# Patient Record
Sex: FEMALE | Race: BLACK OR AFRICAN AMERICAN | NOT HISPANIC OR LATINO
[De-identification: names, ages, dates, MRNs, and addresses within clinical notes are randomized per-mention and may not be internally consistent; named-entity substitution may affect disease eponyms.]

---

## 2019-12-06 PROBLEM — Z00.00 ENCOUNTER FOR PREVENTIVE HEALTH EXAMINATION: Status: ACTIVE | Noted: 2019-12-06

## 2020-01-29 ENCOUNTER — APPOINTMENT (OUTPATIENT)
Dept: INTERNAL MEDICINE | Facility: CLINIC | Age: 50
End: 2020-01-29

## 2020-12-06 ENCOUNTER — EMERGENCY (EMERGENCY)
Facility: HOSPITAL | Age: 50
LOS: 0 days | Discharge: HOME | End: 2020-12-06
Attending: EMERGENCY MEDICINE | Admitting: EMERGENCY MEDICINE
Payer: MEDICAID

## 2020-12-06 VITALS — HEART RATE: 91 BPM | SYSTOLIC BLOOD PRESSURE: 139 MMHG | DIASTOLIC BLOOD PRESSURE: 91 MMHG

## 2020-12-06 VITALS
TEMPERATURE: 99 F | DIASTOLIC BLOOD PRESSURE: 99 MMHG | OXYGEN SATURATION: 98 % | SYSTOLIC BLOOD PRESSURE: 139 MMHG | RESPIRATION RATE: 18 BRPM | HEART RATE: 75 BPM

## 2020-12-06 DIAGNOSIS — F41.9 ANXIETY DISORDER, UNSPECIFIED: ICD-10-CM

## 2020-12-06 DIAGNOSIS — M54.2 CERVICALGIA: ICD-10-CM

## 2020-12-06 DIAGNOSIS — F43.0 ACUTE STRESS REACTION: ICD-10-CM

## 2020-12-06 PROCEDURE — 99283 EMERGENCY DEPT VISIT LOW MDM: CPT

## 2020-12-06 RX ORDER — KETOROLAC TROMETHAMINE 30 MG/ML
15 SYRINGE (ML) INJECTION ONCE
Refills: 0 | Status: DISCONTINUED | OUTPATIENT
Start: 2020-12-06 | End: 2020-12-06

## 2020-12-06 RX ORDER — HYDROXYZINE HCL 10 MG
25 TABLET ORAL ONCE
Refills: 0 | Status: COMPLETED | OUTPATIENT
Start: 2020-12-06 | End: 2020-12-06

## 2020-12-06 RX ORDER — DEXAMETHASONE 0.5 MG/5ML
4 ELIXIR ORAL ONCE
Refills: 0 | Status: COMPLETED | OUTPATIENT
Start: 2020-12-06 | End: 2020-12-06

## 2020-12-06 NOTE — ED BEHAVIORAL HEALTH ASSESSMENT NOTE - SUMMARY
WENDIE ALLAN is a 50y old -American Female, single with 9 children ages 11-30, with no known psychiatric history or IPP admissions, who presented to the ED for neck pain. Psychiatry was consulted for mental health evaluation as patient reportedly had engaged in bizarre, suspicious behavior.    On evaluation, patient does exhibit some anxiety and sub-optimal coping skills for her various psychosocial stressors, this has not impacted her ability to function and take care of herself and her children. While patient is exhibiting some pressured speech and mild grandiosity, she does not appear to currently be having a manic or hypomanic episode, and this appears to be patient's reported chronic baseline. She is not suicidal or psychotic at this time. She is asking for and would likely benefit from establishment of good outpatient psychotherapy.      Recommendations:  -no indication for IPP  -no acute psychiatric intervention required  -please provide patient with information for [facility] clinic at [address], [phone]

## 2020-12-06 NOTE — ED BEHAVIORAL HEALTH ASSESSMENT NOTE - REFERRAL / APPOINTMENT DETAILS
please provide patient with information for [facility] clinic at [address], [phone]

## 2020-12-06 NOTE — ED PROVIDER NOTE - CLINICAL SUMMARY MEDICAL DECISION MAKING FREE TEXT BOX
Patient was signed out to me by Dr. Abraham pending psychiatric recommendations. patient remained stable in ED, patient was evaluated by psychiatry and cleared for discharge. Patient states that she is feeling lot better and wanted to go home. Patient refused any further medical evaluation or care. Patient wanted to follow up with her doctor. Patient is awake, alert, sitting comfortably on the examination table, answering questions appropriately and appears well. Discussed with patient about the need for close outpatient follow up, she verbalized understanding and agreed. Patient is provided with return precautions.
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
## 2020-12-06 NOTE — ED PROVIDER NOTE - PHYSICAL EXAMINATION
CONSTITUTIONAL: Well-developed; well-nourished; in no acute distress.   SKIN: warm, dry  HEAD: Normocephalic; atraumatic.  EYES: PERRL, EOMI, no conjunctival erythema  ENT: No nasal discharge; airway clear.  NECK: Supple; non tender.  CARD: S1, S2 normal; no murmurs, gallops, or rubs. Regular rate and rhythm.   RESP: No wheezes, rales or rhonchi.  ABD: soft ntnd  EXT: Full ROM of neck. No midline or para-spinal TTP. No swelling or bruising.  Impression: Neck pain with no focal findings and anxiety disorder with delusional thoughts, will speak to psych. No SI/HI.  LYMPH: No acute cervical adenopathy.  NEURO: Alert, oriented, grossly unremarkable. CNs 2-12 intact. No focal neuro. deficits.  PSYCH: Pt. paranoid with delusional thoughts, paranoid about medications being offered to her in the ED. CONSTITUTIONAL: Well-developed; well-nourished; in no acute distress.   SKIN: warm, dry  HEAD: Normocephalic; atraumatic.  EYES: PERRL, EOMI, no conjunctival erythema  ENT: No nasal discharge; airway clear.  NECK: Supple; non tender.  CARD: S1, S2 normal; no murmurs, gallops, or rubs. Regular rate and rhythm.   RESP: No wheezes, rales or rhonchi.  ABD: soft ntnd  EXT: Full ROM of neck. No midline or para-spinal TTP. No swelling or bruising.  LYMPH: No acute cervical adenopathy.  NEURO: Alert, oriented, grossly unremarkable. CNs 2-12 intact. No focal neuro. deficits.  PSYCH: Pt. paranoid with delusional thoughts, paranoid about medications being offered to her in the ED.

## 2020-12-06 NOTE — ED BEHAVIORAL HEALTH ASSESSMENT NOTE - RISK ASSESSMENT
Patient's risk of self-harm is mitigated by her lack of suicidal ideation, good social support, good access to care, and willingness to seek help. Low Acute Suicide Risk

## 2020-12-06 NOTE — ED ADULT NURSE NOTE - NSIMPLEMENTINTERV_GEN_ALL_ED
Implemented All Universal Safety Interventions:  [place] to call system. Call bell, personal items and telephone within reach. Instruct patient to call for assistance. Room bathroom lighting operational. Non-slip footwear when patient is off stretcher. Physically safe environment: no spills, clutter or unnecessary equipment. Stretcher in lowest position, wheels locked, appropriate side rails in place.

## 2020-12-06 NOTE — ED ADULT NURSE REASSESSMENT NOTE - NS ED NURSE REASSESS COMMENT FT1
1:1 constant observation initiated by MD Abraham. Pt refusing to get undressed despite education, anxious, screaming at this time. MD at bedside. Charge RN made aware. 1:1 constant observation initiated by MD Abraham. Pt refusing to get changed despite education, anxious, screaming at this time. MD at bedside. Charge RN made aware.

## 2020-12-06 NOTE — ED PROVIDER NOTE - PATIENT PORTAL LINK FT
You can access the FollowMyHealth Patient Portal offered by [facility] by registering at the following website: [email or web address]. By joining [employer]’s FollowMyHealth portal, you will also be able to view your health information using other applications (apps) compatible with our system.

## 2020-12-06 NOTE — ED ADULT TRIAGE NOTE - CHIEF COMPLAINT QUOTE
pt was building a cat house outside and now c.o shooting pains up neck, worse when turning head. also has hx of sinusitis and c/o facial pressure

## 2020-12-06 NOTE — ED PROVIDER NOTE - OBJECTIVE STATEMENT
49 y/o F with PMH of anxiety and depression presenting to the ED for neck pain x2 days. Pt. states that she was putting together a cat bed when she turned her head sharply and suddenly began experiencing sharp pain in her neck  Pain radiates up toward her head. Also endorsing increased fatigue lately, takes care of 9 children, 15 grandchildren, and several cats. Requesting to speak with a therapist about her home stressors. Denies direct trauma or injury to the head or neck, no falls, no LOC, no paresthesias, no SI or HI. 51 y/o F with PMH of anxiety and depression presenting to the ED for neck pain x2 days. Pt. states that she was putting together a cat bed when she turned her head sharply and suddenly began experiencing sharp pain in her neck. Pain radiates up toward her head. Also endorsing increased fatigue lately, takes care of 9 children, 15 grandchildren, and several cats. Requesting to speak with a therapist about her home stressors. Denies direct trauma or injury to the head or neck, no falls, no LOC, no paresthesias, no SI or HI.

## 2020-12-06 NOTE — ED PROVIDER NOTE - PROGRESS NOTE DETAILS
ATTENDING NOTE:   51 y/o F with PMH of anxiety, depression, and chronic sinusitis p/w neck pain x 2 days. Pt was putting together a cat bed when she turned her head sharply and began having sharp pain in her neck radiating to her head. Pt was seen by the ENT PTA, workup was nl. Additionally reports feeling tired; notes she cares for her 9 children, 15 grandchildren, and 13 cats and wishes to speak with a therapist about her stress. Denies f/c, direct trauma, falls, numbness/tingling/weakness, decreased ROM of neck, or SI/HI.   Pt anxious appearing but in NAD. CTAB. Abdomen soft NTND. FROM of neck without tenderness. No midline or para-spinal TTP. S1S2. No LE swelling. No focal neuro deficits. Pt paranoid with delusional thoughts.   Impression: Neck pain with no focal findings and anxiety disorder with delusional thoughts, will speak to psych. No SI/HI. s/o Dr. Morales f/u psuch and re-eval

## 2020-12-06 NOTE — ED ADULT NURSE REASSESSMENT NOTE - NS ED NURSE REASSESS COMMENT FT1
Pt anxious at this time. Pt refusing all the medication despite education. Pt states "I am so anxious and tired right now. I have kids to take care of and i'm overwhelmed. I have a hx of anxiety and depression. I followed up with so many doctors and they couldn't find the reason why I have this symptoms. I want to speak to someone" MD made aware that pt requesting to speak to someone. Pt denies suicidal or homicidal ideation at this time. Will cont to monitor.

## 2020-12-06 NOTE — ED PROVIDER NOTE - CARE PROVIDER_API CALL
Sarah Terry J  INTERNAL MEDICINE  [facility] [place] [address]  Phone: [phone]  Fax: [phone]  Follow Up Time: Routine

## 2020-12-06 NOTE — ED BEHAVIORAL HEALTH ASSESSMENT NOTE - DESCRIPTION
51 y/o F with PMH of anxiety, depression, and chronic sinusitis p/w neck pain x 2 days. Pt was putting together a cat bed when she turned her head sharply and began having sharp pain in her neck radiating to her head. Pt was seen by the ENT PTA, workup was nl. Additionally reports feeling tired; notes she cares for her 9 children, 15 grandchildren, and 13 cats and wishes to speak with a therapist about her stress. Denies f/c, direct trauma, falls, numbness/tingling/weakness, decreased ROM of neck, or SI/HI.   Pt anxious appearing but in NAD. CTAB. Abdomen soft NTND. FROM of neck without tenderness. No midline or para-spinal TTP. S1S2. No LE swelling. No focal neuro deficits. Pt paranoid with delusional thoughts.   Impression: Neck pain with no focal findings and anxiety disorder with delusional thoughts, will speak to psych. No SI/HI. chronic sinusitis has 9 kids, 15 grandkids, lives at home with 4 of her kids (ages 10, 11, 18, 21), employed as hairstylist and in real estate, also has several books published

## 2020-12-06 NOTE — ED PROVIDER NOTE - NSFOLLOWUPINSTRUCTIONS_ED_ALL_ED_FT
Anxiety    Generalized anxiety disorder (ZAHRAA) is a mental disorder. It is defined as anxiety that is not necessarily related to specific events or activities or is out of proportion to said events. Symptoms include restlessness, fatigue, difficulty concentrations, irritability and difficulty concentrating. It interferes with life functions, including relationships, work, and school. If you were started on a medication make sure to take exactly as prescribed and follow up with a psychiatrist.    SEEK IMMEDIATE MEDICAL CARE IF YOU HAVE THE FOLLOWING SYMPTOMS: thoughts about hurting killing yourself, thoughts about hurting or killing somebody else, hallucinations, or worsening depression.    Stress    WHAT YOU NEED TO KNOW:    Stress is a feeling of tension or strain related to the events and pressures of everyday life. Learn to cope and control your stress to help you function in a healthy way.    DISCHARGE INSTRUCTIONS:    Call 911 for any of the following:     You feel like hurting yourself or someone else.      You feel you are overwhelmed and can no longer handle things by yourself.    Contact your healthcare provider if:     You have trouble coping with your stress.      Your symptoms cause problems in your relationships.      You feel depressed.      You have trouble controlling your anger.      You have started to use alcohol, illegal drugs, or prescription medicines, or you increase your current use.      You have questions or concerns about your condition or care.    Ways to manage your stress: Learn what causes you stress. Not all stress can be avoided. Instead, change how you cope with stress by doing any of the following:     Learn relaxation techniques, such as yoga, meditation, or listening to music. Take at least 30 minutes a day to do something you enjoy. This may include taking a bath or reading a book.      Do deep breathing exercises during times of increased stress. Sit up straight and take a slow, deep breath in through your nose. Then breathe out slowly through your mouth. Take twice as long to breathe out as you do when you breathe in. Repeat this a few times until you feel calmer or more focused.       Set realistic goals for yourself. Make a list of tasks and prioritize them. Focus on one task at a time.      Talk to someone about things that upset you. Talk to a trusted friend, family member, or support group. Try to stop yourself when you think negative, angry, or discouraging thoughts.       Take time to exercise. Start slowly, such as walking 1 to 2 blocks each day. Stretch and relax your muscles often. Ask about the best exercise plan for you.       Eat a variety of healthy foods. Healthy foods include fruits, vegetables, whole-grain breads, low-fat dairy products, beans, lean meats, and fish.    Follow up with your healthcare provider as directed: Write down your questions so you remember to ask them during your visits.       Neck Pain    WHAT YOU NEED TO KNOW:    Acute neck pain starts suddenly, increases quickly, and goes away in a few days. The pain may come and go, or be worse with certain movements. The pain may be only in your neck, or it may move to your arms, back, or shoulders. You may also have pain that starts in another body area and moves to your neck. Vertebral Column         DISCHARGE INSTRUCTIONS:    Return to the emergency department if:     You have an injury that causes neck pain and shooting pain down your arms or legs.      Your neck pain suddenly becomes severe.      You have neck pain along with numbness, tingling, or weakness in your arms or legs.      You have a stiff neck, a headache, and a fever.    Contact your healthcare provider if:     You have new or worsening symptoms.      Your symptoms continue even after treatment.      You have questions or concerns about your condition or care.    Medicines:     NSAIDs, such as ibuprofen, help decrease swelling, pain, and fever. This medicine is available without a doctor's order. Ask your healthcare provider which medicine to take and how often to take it. Follow directions. NSAIDs can cause stomach bleeding or kidney problems if not taken correctly. If you take blood thinner medicine, always ask if NSAIDs are safe for you.      Acetaminophen helps decrease pain and fever. Ask your healthcare provider how much to take and how often to take it. Follow directions. Acetaminophen can cause liver damage if not taken correctly.      Steroid medicine may be used to reduce inflammation. This can help relieve pain caused by swelling.      Take your medicine as directed. Contact your healthcare provider if you think your medicine is not helping or if you have side effects. Tell him or her if you are allergic to any medicine. Keep a list of the medicines, vitamins, and herbs you take. Include the amounts, and when and why you take them. Bring the list or the pill bottles to follow-up visits. Carry your medicine list with you in case of an emergency.    Manage or prevent acute neck pain:     Rest your neck as directed. Do not make sudden movements, such as turning your head quickly. Your healthcare provider may recommend you wear a cervical collar for a short time. The collar will prevent you from moving your head. This will give your neck time to heal if an injury is causing your neck pain. Ask your healthcare provider when you can return to sports or other normal daily activities.      Apply heat as directed. Heat helps relieve pain and swelling. Use a heat wrap, or soak a small towel in warm water. Wring out the extra water. Apply the heat wrap or towel for 20 minutes every hour, or as directed.      Apply ice as directed. Ice helps relieve pain and swelling, and can help prevent tissue damage. Use an ice pack, or put ice in a bag. Cover the ice pack or back with a towel before you apply it to your neck. Apply the ice pack or ice for 15 minutes every hour, or as directed. Your healthcare provider can tell you how often to apply ice.      Do neck exercises as directed. Neck exercises help strengthen the muscles and increase range of motion. Your healthcare provider will tell you which exercises are right for you. He may give you instructions, or he may recommend that you work with a physical therapist. Your healthcare provider or therapist can make sure you are doing the exercises correctly.       Maintain good posture. Try to keep your head and shoulders lifted when you sit. If you work in front of a computer, make sure the monitor is at the right level. You should not need to look up down to see the screen. You should also not have to lean forward to be able to read what is on the screen. Make sure your keyboard, mouse, and other computer items are placed where you do not have to extend your shoulder to reach them. Get up often if you work in front of a computer or sit for long periods of time. Stretch or walk around to keep your neck muscles loose.    Follow up with your healthcare provider as directed: Your healthcare provider may refer you to a specialist if your pain does not get better with treatment. Write down your questions so you remember to ask them during your visits.       © Copyright [employer] 2019 All illustrations and images included in CareNotes are the copyrighted property of [employer] or [employer].

## 2020-12-06 NOTE — ED PROVIDER NOTE - NS ED ROS FT
Eyes:  No visual changes, eye pain or discharge.  ENMT:  No hearing changes, pain, discharge or infections. No neck pain or stiffness.  Cardiac:  No chest pain, SOB or edema. No chest pain with exertion.  Respiratory:  No cough or respiratory distress. No hemoptysis. No history of asthma or RAD.  GI:  No nausea, vomiting, diarrhea or abdominal pain.  :  No dysuria, frequency or burning.  MS:  +neck pain  Neuro:  No headache. No LOC.  Skin:  No skin rash.
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
## 2020-12-06 NOTE — ED ADULT NURSE NOTE - OBJECTIVE STATEMENT
Pt c/o neck pain and facial pressure x 1 day. Pt states pt was building a cat house yesterday and pain got worse. denies any other symptoms

## 2020-12-06 NOTE — ED BEHAVIORAL HEALTH ASSESSMENT NOTE - SUICIDE PROTECTIVE FACTORS
Identifies reasons for living/Has future plans/Cultural, spiritual and/or moral attitudes against suicide/Engaged in work or school/Responsibility to family and others/Supportive social network of family or friends